# Patient Record
Sex: MALE | Race: WHITE | Employment: STUDENT | URBAN - NONMETROPOLITAN AREA
[De-identification: names, ages, dates, MRNs, and addresses within clinical notes are randomized per-mention and may not be internally consistent; named-entity substitution may affect disease eponyms.]

---
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
## 2023-05-09 ENCOUNTER — APPOINTMENT (OUTPATIENT)
Dept: GENERAL RADIOLOGY | Age: 23
End: 2023-05-09
Payer: COMMERCIAL

## 2023-05-09 ENCOUNTER — HOSPITAL ENCOUNTER (EMERGENCY)
Age: 23
Discharge: HOME | End: 2023-05-09
Payer: COMMERCIAL

## 2023-05-09 VITALS
WEIGHT: 155 LBS | TEMPERATURE: 99 F | HEART RATE: 67 BPM | SYSTOLIC BLOOD PRESSURE: 136 MMHG | DIASTOLIC BLOOD PRESSURE: 63 MMHG | BODY MASS INDEX: 23.49 KG/M2 | HEIGHT: 68 IN | OXYGEN SATURATION: 100 % | RESPIRATION RATE: 14 BRPM

## 2023-05-09 DIAGNOSIS — S80.11XA CONTUSION OF RIGHT LOWER LEG, INITIAL ENCOUNTER: Primary | ICD-10-CM

## 2023-05-09 PROCEDURE — 73590 X-RAY EXAM OF LOWER LEG: CPT

## 2023-05-09 PROCEDURE — 99203 OFFICE O/P NEW LOW 30 MIN: CPT

## 2023-05-09 ASSESSMENT — PAIN DESCRIPTION - FREQUENCY: FREQUENCY: INTERMITTENT

## 2023-05-09 ASSESSMENT — PAIN DESCRIPTION - ORIENTATION: ORIENTATION: RIGHT;INNER;LOWER

## 2023-05-09 ASSESSMENT — PAIN DESCRIPTION - PAIN TYPE: TYPE: ACUTE PAIN

## 2023-05-09 ASSESSMENT — PAIN - FUNCTIONAL ASSESSMENT
PAIN_FUNCTIONAL_ASSESSMENT: ACTIVITIES ARE NOT PREVENTED
PAIN_FUNCTIONAL_ASSESSMENT: 0-10

## 2023-05-09 ASSESSMENT — ENCOUNTER SYMPTOMS
SHORTNESS OF BREATH: 0
ABDOMINAL PAIN: 0
EYES NEGATIVE: 1
ALLERGIC/IMMUNOLOGIC NEGATIVE: 1
SORE THROAT: 0

## 2023-05-09 ASSESSMENT — PAIN DESCRIPTION - ONSET: ONSET: SUDDEN

## 2023-05-09 ASSESSMENT — PAIN DESCRIPTION - DESCRIPTORS: DESCRIPTORS: SORE

## 2023-05-09 ASSESSMENT — PAIN DESCRIPTION - LOCATION: LOCATION: LEG

## 2023-05-09 ASSESSMENT — PAIN SCALES - GENERAL: PAINLEVEL_OUTOF10: 2

## 2023-05-09 NOTE — ED PROVIDER NOTES
Medications - No data to display  PROCEDURES:  FINALIMPRESSION      1. Contusion of right lower leg, initial encounter        DISPOSITION/PLAN   DISPOSITION Decision To Discharge 05/09/2023 01:23:15 PM    X-ray of the right lower extremity shows no fracture or dislocation. Tetanus is up-to-date. Abrasion does not need closure. Continue to rest, ice, compress, elevate the right lower extremity. Take as needed Aminofen and ibuprofen as needed for pain. Occupational health appointment was made for patient. PATIENT REFERRED TO:  No follow-up provider specified. DISCHARGE MEDICATIONS:  There are no discharge medications for this patient. There are no discharge medications for this patient.       Curtis Lopez, ANICETO - ANICETO Daugherty - CNP  05/09/23 [id number]

## 2023-05-09 NOTE — DISCHARGE INSTRUCTIONS
Continue to rest, ice, compress, elevate the right leg. Follow-up with occupational health on Thursday.

## 2025-06-26 ENCOUNTER — OFFICE VISIT (OUTPATIENT)
Dept: ENT CLINIC | Age: 25
End: 2025-06-26
Payer: COMMERCIAL

## 2025-06-26 VITALS
DIASTOLIC BLOOD PRESSURE: 74 MMHG | RESPIRATION RATE: 16 BRPM | HEIGHT: 67 IN | TEMPERATURE: 98.1 F | BODY MASS INDEX: 23.02 KG/M2 | HEART RATE: 73 BPM | OXYGEN SATURATION: 98 % | SYSTOLIC BLOOD PRESSURE: 118 MMHG | WEIGHT: 146.7 LBS

## 2025-06-26 DIAGNOSIS — J31.2 CHRONIC PHARYNGITIS: Primary | ICD-10-CM

## 2025-06-26 PROCEDURE — 99202 OFFICE O/P NEW SF 15 MIN: CPT | Performed by: OTOLARYNGOLOGY

## 2025-06-26 NOTE — PROGRESS NOTES
[facility] [place] [facility]  [facility]SE [facility]  [address]  [address]  [facility] [address]  Dept: [phone]  Dept Fax: [phone]  Loc: [phone]    Rajan Polk is a 25 y.o. adult who was referred bySahara Patel APRN* for:  Chief Complaint   Patient presents with    New Patient     New patient is here for chronic pharyngitis and tonsillitis, referred by Sahara Patel CNP. He has bumps on the uvula. He has had throat swelling for about a year. He gets a lot of tonsil stones.    .    HPI:     Rajan Polk is a 25 y.o. adult who presents today for chronic pharyngitis and tonsillitis . He has bumps on the uvula. He has had throat swelling for about a year. He gets a lot of tonsil stones.     History:     No Known Allergies  No current outpatient medications on file.     No current facility-administered medications for this visit.     History reviewed. No pertinent past medical history.   History reviewed. No pertinent surgical history.  Family History   Problem Relation Age of Onset    No Known Problems Mother     No Known Problems Father      Social History     Tobacco Use    Smoking status: Former     Average packs/day: (0.1 ttl pk-yrs)     Types: Cigarettes     Start date: 1/1/2020    Smokeless tobacco: Never   Substance Use Topics    Alcohol use: No       Subjective:      Review of Systems  has been obtained. Pertinent positives are noted above.  See HPI for further details. Review of systems otherwise negative.     Objective:   /74 (BP Site: Left Upper Arm, Patient Position: Sitting)   Pulse 73   Temp 98.1 °F (36.7 °C) (Oral)   Resp 16   Ht 1.702 m (5' 7\")   Wt 66.5 kg (146 lb 11.2 oz)   SpO2 98%   BMI 22.98 kg/m²       GENERAL: alert and oriented to person, place and time, well developed and well- nourished, in no acute distress.  Skin: warm and dry, no rash or erythema.  Head: Normocephalic and atraumatic.   EYE:  Ocular motility

## 2025-06-28 LAB — BACTERIA THROAT AEROBE CULT: NORMAL
